# Patient Record
Sex: MALE | Race: WHITE
[De-identification: names, ages, dates, MRNs, and addresses within clinical notes are randomized per-mention and may not be internally consistent; named-entity substitution may affect disease eponyms.]

---

## 2019-03-18 LAB
ALBUMIN SERPL BCG-MCNC: 4.6 G/DL (ref 3.2–5.2)
ALBUMIN/GLOB SERPL: 2 {RATIO} (ref 1–3)
ALP SERPL-CCNC: 60 U/L (ref 34–104)
ALT SERPL W P-5'-P-CCNC: 23 U/L (ref 7–52)
ANION GAP SERPL CALC-SCNC: 7 MMOL/L (ref 2–11)
APTT PPP: 28.9 SECONDS (ref 26–36.3)
AST SERPL-CCNC: 21 U/L (ref 13–39)
BASOPHILS # BLD AUTO: 0 10^3/UL (ref 0–0.2)
BUN SERPL-MCNC: 12 MG/DL (ref 6–24)
BUN/CREAT SERPL: 12 (ref 8–20)
CALCIUM SERPL-MCNC: 9.2 MG/DL (ref 8.6–10.3)
CHLORIDE SERPL-SCNC: 102 MMOL/L (ref 101–111)
EOSINOPHIL # BLD AUTO: 0.1 10^3/UL (ref 0–0.6)
GLOBULIN SER CALC-MCNC: 2.3 G/DL (ref 2–4)
GLUCOSE SERPL-MCNC: 89 MG/DL (ref 70–100)
HCO3 SERPL-SCNC: 28 MMOL/L (ref 22–32)
HCT VFR BLD AUTO: 44 % (ref 36–46)
HGB BLD-MCNC: 15.3 G/DL (ref 14–18)
INR PPP/BLD: 0.91 (ref 0.77–1.02)
LYMPHOCYTES # BLD AUTO: 1.6 10^3/UL (ref 1–4.8)
MCH RBC QN AUTO: 33 PG (ref 27–31)
MCHC RBC AUTO-ENTMCNC: 35 G/DL (ref 31–36)
MCV RBC AUTO: 93 FL (ref 80–94)
MONOCYTES # BLD AUTO: 0.5 10^3/UL (ref 0–0.8)
NEUTROPHILS # BLD AUTO: 5.7 10^3/UL (ref 1.5–7.7)
NRBC # BLD AUTO: 0 10^3/UL
NRBC BLD QL AUTO: 0.1
PLATELET # BLD AUTO: 256 10^3/UL (ref 150–450)
POTASSIUM SERPL-SCNC: 4.2 MMOL/L (ref 3.5–5)
PROT SERPL-MCNC: 6.9 G/DL (ref 6.4–8.9)
RBC # BLD AUTO: 4.7 10^6 /UL (ref 4.18–5.48)
SODIUM SERPL-SCNC: 137 MMOL/L (ref 135–145)
WBC # BLD AUTO: 8 10^3/UL (ref 3.5–10.8)

## 2019-03-18 NOTE — HP
HISTORY AND PHYSICAL:

 

DATE OF ADMISSION/SURGERY:  03/26/19

 

DATE OF OFFICE VISIT:  03/18/19

 

SURGEON:  Rosa Ariza MD.* (DICTATED BY SUSIE GARBER)

 

PROCEDURE:  Left total knee arthroplasty.

 

CHIEF COMPLAINT:  Left knee pain.

 

HISTORY OF PRESENT ILLNESS:  Mr. Morataya is a 53-year-old gentleman with 
continued complaints of left knee pain.  He has failed conservative treatment 
and elected to proceed with a left total knee arthroplasty.

 

PAST MEDICAL HISTORY:  Sleep apnea.

 

PAST SURGICAL HISTORY:  Right total hip arthroplasty and appendectomy.

 

CURRENT MEDICATIONS:  Fluoxetine 40 mg a day.

 

ALLERGIES:  AMOXICILLIN and TIMOPTIC DROPS.

 

FAMILY HISTORY:  Cancer.

 

SOCIAL HISTORY:  He is a 53-year-old gentleman, lives with his wife.  He does 
not smoke or use drugs.  Uses occasional alcohol.

 

REVIEW OF SYSTEMS:  A complete 14-point review of systems was reviewed with the 
patient.  It was all negative or noncontributory.  He denies history of DVT, PE
, hepatitis, HIV, or anesthesia problems.

 

                               PHYSICAL EXAMINATION

 

GENERAL:  He is well developed, well nourished, in no acute distress.

 

VITAL SIGNS:  He stands 5 feet 1 inch tall, weighs 204 pounds.  His blood 
pressure is 134/80, his heart rate is 76.

 

HEENT:  Normocephalic, atraumatic.

 

NECK:  Supple.  No palpable lymph nodes.

 

PULMONARY:  The lungs are clear to auscultation bilaterally.

 

CARDIO:  Regular rate and rhythm.  Strong S1, S2.

 

ABDOMEN:  Soft, nontender, nondistended.

 

NEUROLOGICAL:  He is alert and oriented x3.

 

MUSCULOSKELETAL:  Left lower extremity:  The skin is intact.  There are no open 
wounds or abrasions.  There is a moderate joint effusion.  There is a valgus 
deformity of 12 degrees.  Range of motion is 10 to 125 degrees of flexion with 
significant patellofemoral crepitus.  He has a 2+ dorsalis pedis pulse.  He is 
able to dorsiflex and plantarflex and has intact sensation.

 

 ASSESSMENT AND PLAN:  Mr. Morataya is a 53-year-old gentleman with end-stage 
osteoarthritis of the left knee.  He has failed conservative treatment and 
elected to proceed with a left total knee arthroplasty.  The surgery is 
scheduled for 03/26/19 with Dr. Ariza.  Dr. Ariza discussed the risks and 
benefits of the surgery at today's visit and all of his questions were 
answered.  He will follow up with Dr. Ariza 2 weeks after the surgery.

 

 ____________________________________ SUSIE GARBER

 

[id number]/CPS #: [id number]

YAS

## 2019-03-26 ENCOUNTER — HOSPITAL ENCOUNTER (OUTPATIENT)
Dept: HOSPITAL 25 - OR | Age: 54
Setting detail: OBSERVATION
LOS: 1 days | Discharge: HOME | End: 2019-03-27
Attending: ORTHOPAEDIC SURGERY | Admitting: ORTHOPAEDIC SURGERY
Payer: COMMERCIAL

## 2019-03-26 DIAGNOSIS — M17.12: Primary | ICD-10-CM

## 2019-03-26 DIAGNOSIS — F32.9: ICD-10-CM

## 2019-03-26 DIAGNOSIS — Z85.828: ICD-10-CM

## 2019-03-26 DIAGNOSIS — Z96.641: ICD-10-CM

## 2019-03-26 DIAGNOSIS — Z79.899: ICD-10-CM

## 2019-03-26 DIAGNOSIS — Z88.1: ICD-10-CM

## 2019-03-26 DIAGNOSIS — Z91.09: ICD-10-CM

## 2019-03-26 DIAGNOSIS — G47.33: ICD-10-CM

## 2019-03-26 PROCEDURE — 85018 HEMOGLOBIN: CPT

## 2019-03-26 PROCEDURE — 96375 TX/PRO/DX INJ NEW DRUG ADDON: CPT

## 2019-03-26 PROCEDURE — 81003 URINALYSIS AUTO W/O SCOPE: CPT

## 2019-03-26 PROCEDURE — G0378 HOSPITAL OBSERVATION PER HR: HCPCS

## 2019-03-26 PROCEDURE — 80048 BASIC METABOLIC PNL TOTAL CA: CPT

## 2019-03-26 PROCEDURE — C1776 JOINT DEVICE (IMPLANTABLE): HCPCS

## 2019-03-26 PROCEDURE — 86900 BLOOD TYPING SEROLOGIC ABO: CPT

## 2019-03-26 PROCEDURE — 85610 PROTHROMBIN TIME: CPT

## 2019-03-26 PROCEDURE — 87086 URINE CULTURE/COLONY COUNT: CPT

## 2019-03-26 PROCEDURE — 85014 HEMATOCRIT: CPT

## 2019-03-26 PROCEDURE — 85730 THROMBOPLASTIN TIME PARTIAL: CPT

## 2019-03-26 PROCEDURE — 36415 COLL VENOUS BLD VENIPUNCTURE: CPT

## 2019-03-26 PROCEDURE — 80053 COMPREHEN METABOLIC PANEL: CPT

## 2019-03-26 PROCEDURE — 96374 THER/PROPH/DIAG INJ IV PUSH: CPT

## 2019-03-26 PROCEDURE — 85025 COMPLETE CBC W/AUTO DIFF WBC: CPT

## 2019-03-26 PROCEDURE — 86901 BLOOD TYPING SEROLOGIC RH(D): CPT

## 2019-03-26 PROCEDURE — 85049 AUTOMATED PLATELET COUNT: CPT

## 2019-03-26 PROCEDURE — 86850 RBC ANTIBODY SCREEN: CPT

## 2019-03-26 RX ADMIN — OXYCODONE HYDROCHLORIDE PRN MG: 5 CAPSULE ORAL at 22:59

## 2019-03-26 RX ADMIN — TRAMADOL HYDROCHLORIDE PRN MG: 50 TABLET, FILM COATED ORAL at 21:32

## 2019-03-26 RX ADMIN — OXYCODONE HYDROCHLORIDE PRN MG: 5 CAPSULE ORAL at 13:27

## 2019-03-26 RX ADMIN — DOCUSATE SODIUM SCH MG: 100 CAPSULE, LIQUID FILLED ORAL at 21:32

## 2019-03-26 RX ADMIN — ACETAMINOPHEN SCH MG: 325 TABLET ORAL at 13:27

## 2019-03-26 RX ADMIN — SODIUM CHLORIDE, SODIUM LACTATE, POTASSIUM CHLORIDE, AND CALCIUM CHLORIDE SCH MLS/HR: 600; 310; 30; 20 INJECTION, SOLUTION INTRAVENOUS at 23:03

## 2019-03-26 RX ADMIN — TRAMADOL HYDROCHLORIDE PRN MG: 50 TABLET, FILM COATED ORAL at 15:19

## 2019-03-26 RX ADMIN — MAGNESIUM HYDROXIDE SCH ML: 400 SUSPENSION ORAL at 21:32

## 2019-03-26 RX ADMIN — SODIUM CHLORIDE, SODIUM LACTATE, POTASSIUM CHLORIDE, AND CALCIUM CHLORIDE SCH MLS/HR: 600; 310; 30; 20 INJECTION, SOLUTION INTRAVENOUS at 12:25

## 2019-03-26 RX ADMIN — CYCLOBENZAPRINE HYDROCHLORIDE PRN MG: 10 TABLET, FILM COATED ORAL at 23:03

## 2019-03-26 RX ADMIN — OXYCODONE HYDROCHLORIDE PRN MG: 5 CAPSULE ORAL at 17:52

## 2019-03-26 RX ADMIN — ACETAMINOPHEN SCH MG: 325 TABLET ORAL at 21:40

## 2019-03-26 NOTE — PN
Progress Note





- Progress Note


Date of Service: 03/26/19


Note: 





Pt seen at bedside POD o sp total knee with Dr Ariza. Pain is rated 2/10. 

Denies CP, SOB, dizziness, nausea. Dressing CDI, DF/PF intact, DP2+, sensation 

intact to light touch distally. Eliquis 2.5 mg po BID starting 3/27 and SCDs 

for DVT prophylaxis.

## 2019-03-27 VITALS — SYSTOLIC BLOOD PRESSURE: 131 MMHG | DIASTOLIC BLOOD PRESSURE: 81 MMHG

## 2019-03-27 LAB
ANION GAP SERPL CALC-SCNC: 5 MMOL/L (ref 2–11)
BUN SERPL-MCNC: 11 MG/DL (ref 6–24)
BUN/CREAT SERPL: 13.4 (ref 8–20)
CALCIUM SERPL-MCNC: 8.3 MG/DL (ref 8.6–10.3)
CHLORIDE SERPL-SCNC: 97 MMOL/L (ref 101–111)
GLUCOSE SERPL-MCNC: 127 MG/DL (ref 70–100)
HCO3 SERPL-SCNC: 29 MMOL/L (ref 22–32)
HCT VFR BLD AUTO: 34 % (ref 36–46)
HGB BLD-MCNC: 12 G/DL (ref 14–18)
PLATELET # BLD AUTO: 240 10^3/UL (ref 150–450)
POTASSIUM SERPL-SCNC: 3.9 MMOL/L (ref 3.5–5)
SODIUM SERPL-SCNC: 131 MMOL/L (ref 135–145)

## 2019-03-27 RX ADMIN — OXYCODONE HYDROCHLORIDE PRN MG: 5 CAPSULE ORAL at 10:12

## 2019-03-27 RX ADMIN — ACETAMINOPHEN SCH MG: 325 TABLET ORAL at 13:49

## 2019-03-27 RX ADMIN — CYCLOBENZAPRINE HYDROCHLORIDE PRN MG: 10 TABLET, FILM COATED ORAL at 13:48

## 2019-03-27 RX ADMIN — DOCUSATE SODIUM SCH MG: 100 CAPSULE, LIQUID FILLED ORAL at 07:56

## 2019-03-27 RX ADMIN — TRAMADOL HYDROCHLORIDE PRN MG: 50 TABLET, FILM COATED ORAL at 03:31

## 2019-03-27 RX ADMIN — ACETAMINOPHEN SCH MG: 325 TABLET ORAL at 06:28

## 2019-03-27 RX ADMIN — OXYCODONE HYDROCHLORIDE PRN MG: 5 CAPSULE ORAL at 04:41

## 2019-03-27 RX ADMIN — MAGNESIUM HYDROXIDE SCH ML: 400 SUSPENSION ORAL at 07:56

## 2019-03-27 RX ADMIN — TRAMADOL HYDROCHLORIDE PRN MG: 50 TABLET, FILM COATED ORAL at 12:16

## 2019-03-27 RX ADMIN — OXYCODONE HYDROCHLORIDE PRN MG: 5 CAPSULE ORAL at 15:10

## 2019-03-27 NOTE — OP
OPERATIVE REPORT:

 

DATE OF OPERATION:  03/26/19

 

DATE OF BIRTH:  [birth year]

 

ATTENDING SURGEON:  Rosa Ariza MD.

 

ASSISTANT:  SUSIE Alfonso.

 

Ms. Ibrahim did help throughout the procedure with preparation of the knee, wound retraction, manipu
lation of the knee, and wound closure.

 

ANESTHESIOLOGIST:  Dr. Thomas.

 

ANESTHESIA:  Spinal.

 

PRE-OP DIAGNOSIS:  Severe end-stage degenerative osteoarthritis of the left knee joint.

 

POST-OP DIAGNOSIS:  Severe end-stage degenerative osteoarthritis of the left knee joint.

 

OPERATIVE PROCEDURE:  Left total knee arthroplasty.

 

TOURNIQUET TIME:  51 minutes.

 

COMPLICATIONS:  None.

 

ESTIMATED BLOOD LOSS:  200 cc.

 

SPECIMEN:  Bone and cartilage from the left knee joint sent to pathology.

 

HARDWARE USED:  This is cemented [employer] total knee arthroplasty hardware with 2 packages of
 Simplex bone cement.  For the femur, a size left 7 Oxinium posterior stabilized femoral component.  
For the tibia, a size 6 left tibial baseplate Claudia II.  For the insert, a 9 mm posterior stabilize
d articular insert size 5/6 and for the patella, a 35 mm 3-peg hole all-poly patella.

 

BRIEF HISTORY/INDICATION:  Mr. Morataya is a 53-year-old gentleman with years of increasingly severe 
left knee pain.  Radiographs showed advanced arthritis with bone-on-bone contact.  He failed conserva
tive treatment and elected to undergo left total knee arthroplasty.  Informed consent was obtained fr
om the patient.  He understood the risks of surgery included, but were not limited to bleeding, infec
tion, damage to nearby structures, continued pain, need for further surgery, intraoperative fracture,
 nerve palsy, hardware failure or loosening, knee stiffness, loss of motion, stroke, heart attack, bl
ood clot, death, and anesthesia complications.  He wished to proceed.

 

INTRAOPERATIVE FINDINGS:  Intraoperatively, the patient was noted to have severe end-stage arthritis 
with complete loss of cartilage in all 3 compartments.  He had extensive osteophyte formation.

 

DESCRIPTION OF PROCEDURE:  Mr. Morataya was identified in the preanesthesia unit. His left lower extr
emity was marked as the correct operative site.  Informed consent was signed and placed in the chart.
  The patient was taken to the operating room and placed under spinal anesthesia.  A Duenas catheter w
as placed.  A tourniquet was placed on the left thigh.  The left lower extremity was prepped and drap
ed in the usual sterile fashion.  Preop time-out was made to correctly identify the patient, side, an
d site.  Appropriate perioperative antibiotics were given within 1 hour of the incision.

 

Tourniquet was inflated and total tourniquet time for this procedure was 51 minutes.  A midline incis
ion was made with a 10-blade and carried down to the extensor mechanism.  A new 10-blade was used to 
make a standard medial parapatellar arthrotomy. The patella was subluxed laterally.  Electrocautery w
as used to subperiosteally  elevate the soft tissue off the superomedial tibia to the midsagittal mayra
ne.  The knee was flexed up.  The anterior horn of the lateral meniscus and ACL were sharply released
.  A drill was used to enter the distal femur.  Intramedullary distal femoral cutting guide was pinne
d on the distal femur. An oscillating saw was used to make a distal femoral cut.  Next, the external 
rotation guide was pinned on the distal femur.  The distal femur was sized to a size 7.  A size 7 mul
ti-cutting jig was pinned on the distal femur.  The oscillating saw was used to make the appropriate 
4 chamfer cuts.

 

The PCL was completely released.  An extramedullary tibial cutting guide was pinned on the proximal t
ibia.  Oscillating saw was used to make the proximal tibial cut perpendicular to the mechanical axis 
of the tibia.  The bone was carefully removed. The knee was brought out to full extension.  A spacer 
block had good fit with the knee in full extension.  Medial and lateral ligaments were well balanced.
  Flexion and extension gaps were well balanced.  The knee was flexed up.  The lamina  was pl
aced both medially and laterally.  Any remaining meniscus was carefully removed using electrocautery.
  A curved osteotome was used to remove any posterior osteophytes.  Tibial tray and drop rods were pl
aced and confirmed a satisfactory tibial cut.  A size 7 left femoral trial was impacted onto the dist
al femur and had excellent fit and stability.  The box for the posterior stabilized implant was prepa
red using a reamer and box cut osteotome.  A size 6 tibial tray trial with a 9 mm insert trial was pl
aced and the knee was taken through a range of motion.  The knee had full extension to 130 degrees of
 flexion with satisfactory patellofemoral tracking.  The patella was everted; 9 mm of patellar bone a
nd cartilage was carefully removed using an oscillating saw.  The patella was sized to a size 35.  Th
ree peg holes were drilled through the size 35 guide.  A 35 size trial patella was placed and the kne
e was taken through a range of motion.  There was satisfactory patellofemoral tracking.

 

All trials were carefully removed.  The tibia was subluxed anteriorly and sized to a size 6.  The pro
ximal tibia was prepared using a size 6 keel punch.  All bony cut surfaces were copiously irrigated w
ith sterile saline and dried.  Final implants were cemented into place starting with the tibia, follo
wed by the femur and last the patella.  A 9 mm insert trial was placed and the knee was brought out t
o full extension.  Tourniquet was turned down at 51 minutes.  The knee was copiously irrigated with s
terile saline.  Electrocautery was used to obtain meticulous hemostasis.  Once the cement had fully c
ured, the insert trial was removed.  Any excess cement was removed from around the capsule and hardwa
re.  Final insert chosen was a 9 mm posterior stabilized articular insert, size 5/6.  This was locked
 into position on the tibial tray.  Stability of the insert was checked and rechecked and noted to be
 stable.  The extensor mechanism was closed using interrupted #1 Vicryls.  The rest of the incision w
as closed in a layered fashion using 0 and 2-0 Vicryls.  Skin was closed using running 3-0 nylon sutu
re.  Sterile Xeroform, 4x4s, and Webril were used to cover the incision.  Ace wrap and cold pack were
 placed over this.  The patient's anesthesia was reversed without difficulty. He was taken to the PAC
U in stable condition.  Intended weightbearing will be weightbearing as tolerated.  Intended DVT prop
hylaxis will be Eliquis.

 

 [id number]/[facility] #: [id number]

## 2019-03-27 NOTE — PN
Progress Note





- Progress Note


Date of Service: 03/27/19


SOAP: 


Subjective:


[]Pt seen at bedside. He feels very well with well controlled knee pain and 

desires to go home today. Denies CP, SOB, dizziness, nausea. 





Objective:


[]General: Well appearing, NAD 


LLE: Left knee dressing changed, incision CDI. Thigh is soft, DF/PT intact, 

sensation intact to light touch distally, DP2+.


Calves supple and nontender without erythema, edema or palpable cords





Assessment:


[] POD 1 sp LTK





Plan:


[]WBAT


PT/OT 


eliquis 2.5 mg po BID 


DC home today after afternoon PT 


Will need repeat sodium as an outpt 





 Vital Signs











Temp  98.3 F   03/27/19 07:57


 


Pulse  71   03/27/19 07:57


 


Resp  20   03/27/19 10:12


 


BP  124/69   03/27/19 07:57


 


Pulse Ox  95   03/27/19 08:00








 Intake & Output











 03/26/19 03/27/19 03/27/19





 18:59 06:59 18:59


 


Intake Total 3620 1880 


 


Output Total 550 1475 500


 


Balance 3070 405 -500


 


Intake:   


 


  IV Fluids 2700 980 


 


    LR 2700 980 


 


  IVPB  50 


 


    ABX - CLINDAMYCIN  50 


 


  Oral 920 850 


 


Output:   


 


  Urine 550 975 500


 


  Duenas  500 


 


Other:   


 


  # Voids   1








 Laboratory Last Values











WBC  8.0 10^3/uL (3.5-10.8)   03/18/19  12:00    


 


RBC  4.70 10^6 /uL (4.18-5.48)   03/18/19  12:00    


 


Hgb  12.0 g/dL (14.0-18.0)  L  03/27/19  06:46    


 


Hct  34 % (36-46)  L  03/27/19  06:46    


 


MCV  93 fL (80-94)   03/18/19  12:00    


 


MCH  33 pg (27-31)  H  03/18/19  12:00    


 


MCHC  35 g/dL (31-36)   03/18/19  12:00    


 


RDW  13 % (10.5-15)   03/18/19  12:00    


 


Plt Count  240 10^3/uL (150-450)   03/27/19  06:46    


 


MPV  6.9 fL (7.4-10.4)  L  03/27/19  06:46    


 


Neut % (Auto)  71.3 %  03/18/19  12:00    


 


Lymph % (Auto)  20.4 %  03/18/19  12:00    


 


Mono % (Auto)  5.9 %  03/18/19  12:00    


 


Eos % (Auto)  1.9 %  03/18/19  12:00    


 


Baso % (Auto)  0.5 %  03/18/19  12:00    


 


Absolute Neuts (auto)  5.7 10^3/ul (1.5-7.7)   03/18/19  12:00    


 


Absolute Lymphs (auto)  1.6 10^3/ul (1.0-4.8)   03/18/19  12:00    


 


Absolute Monos (auto)  0.5 10^3/ul (0-0.8)   03/18/19  12:00    


 


Absolute Eos (auto)  0.1 10^3/ul (0-0.6)   03/18/19  12:00    


 


Absolute Basos (auto)  0 10^3/ul (0-0.2)   03/18/19  12:00    


 


Absolute Nucleated RBC  0 10^3/ul  03/18/19  12:00    


 


Nucleated RBC %  0.1   03/18/19  12:00    


 


INR (Anticoag Therapy)  0.91  (0.77-1.02)   03/18/19  12:00    


 


APTT  28.9 seconds (26.0-36.3)   03/18/19  12:00    


 


Sodium  131 mmol/L (135-145)  L  03/27/19  06:46    


 


Potassium  3.9 mmol/L (3.5-5.0)   03/27/19  06:46    


 


Chloride  97 mmol/L (101-111)  L  03/27/19  06:46    


 


Carbon Dioxide  29 mmol/L (22-32)   03/27/19  06:46    


 


Anion Gap  5 mmol/L (2-11)   03/27/19  06:46    


 


BUN  11 mg/dL (6-24)   03/27/19  06:46    


 


Creatinine  0.82 mg/dL (0.67-1.17)   03/27/19  06:46    


 


Est GFR ( Amer)  118.0  (>60)   03/27/19  06:46    


 


Est GFR (Non-Af Amer)  97.5  (>60)   03/27/19  06:46    


 


BUN/Creatinine Ratio  13.4  (8-20)   03/27/19  06:46    


 


Glucose  127 mg/dL ()  H  03/27/19  06:46    


 


Calcium  8.3 mg/dL (8.6-10.3)  L  03/27/19  06:46    


 


Total Bilirubin  0.70 mg/dL (0.2-1.0)   03/18/19  12:00    


 


AST  21 U/L (13-39)   03/18/19  12:00    


 


ALT  23 U/L (7-52)   03/18/19  12:00    


 


Alkaline Phosphatase  60 U/L ()   03/18/19  12:00    


 


Total Protein  6.9 g/dL (6.4-8.9)   03/18/19  12:00    


 


Albumin  4.6 g/dL (3.2-5.2)   03/18/19  12:00    


 


Globulin  2.3 g/dL (2-4)   03/18/19  12:00    


 


Albumin/Globulin Ratio  2.0  (1-3)   03/18/19  12:00    


 


Urine Color  Madyson   03/18/19  12:00    


 


Urine Appearance  Clear   03/18/19  12:00    


 


Urine pH  6.0  (5-9)   03/18/19  12:00    


 


Ur Specific Gravity  1.024  (1.010-1.030)   03/18/19  12:00    


 


Urine Protein  Negative  (Negative)   03/18/19  12:00    


 


Urine Ketones  Negative  (Negative)   03/18/19  12:00    


 


Urine Blood  Negative  (Negative)   03/18/19  12:00    


 


Urine Nitrate  Negative  (Negative)   03/18/19  12:00    


 


Urine Bilirubin  Negative  (Negative)   03/18/19  12:00    


 


Urine Urobilinogen  Negative  (Negative)   03/18/19  12:00    


 


Ur Leukocyte Esterase  Negative  (Negative)   03/18/19  12:00    


 


Urine Glucose  Negative  (Negative)   03/18/19  12:00    


 


Blood Type  B Positive   03/18/19  12:00    


 


Antibody Screen  Negative   03/18/19  12:00

## 2019-03-28 NOTE — DS
CC:  Dr. Rosa Ariza*

 

DISCHARGE SUMMARY:

 

DATE OF ADMISSION:  03/26/19

 

DATE OF DISCHARGE:  03/27/19

 

PROVIDER:  Dr. Rosa Ariza* (dictated by SUSIE Mays).

 

PREOP DIAGNOSIS:  Severe end-stage degenerative osteoarthritis of left knee 
joint.

 

OPERATIVE PROCEDURE:  Left total knee arthroplasty.

 

HISTORY:  Mr. Morataya is a 53-year-old male with years of increasingly severe 
left knee pain.  He failed conservative treatment and elected to undergo a left 
total knee arthroplasty.

 

HOSPITAL COURSE:  The patient was admitted to [facility] on 03/26/
19. He underwent left total knee arthroplasty without complication.  Postop day 
#1, he is well appearing in no acute distress.  Dressing was changed.  Incision 
clean, dry, and intact. Thigh is soft.  Dorsiflexion and plantarflexion intact.
  Sensation intact to light touch distally.  DP 2+.  Calf supple and nontender 
without erythema, edema or palpable cords.

 

Vital Signs:  Temperature 98.3, pulse 71, respiratory rate 20, blood pressure 
124/69, and pulse ox 95%.

 

LABORATORY DATA:  Hemoglobin 12.3, hematocrit 34.  Sodium 131, potassium 3.9.

 

The patient is deemed to be medically and orthopedically stable for discharge 
home.

 

DISCHARGE MEDICATIONS:

1.  Fluoxetine 60 mg p.o. q.a.m.

2.  Multivitamin 1 tab daily.

3.  Glucosamine chondroitin 1 tab p.o. q.a.m.

4.  Eliquis 2.5 mg p.o. b.i.d. for 30 days.

5.  Docusate 100 mg p.o. b.i.d.

6.  Percocet 5/325 one to two tabs every 4 to 6 hours as needed for pain, max 
daily dose of 10.

7.  Flexeril 10 mg tabs 1 tab p.o. t.i.d. p.r.n.

 

DISCHARGE INSTRUCTIONS:  The patient will be weightbearing as tolerated.  He 
needs a sodium level drawn as an outpatient within 2 days for low sodium in-
house.  DVT prophylaxis with Eliquis 2.5 mg daily for 1 month.  Pain control 
Percocet 5/325 one to two tabs by mouth every 4 to 6 hours as needed for pain, 
max daily dose of 10. Follow up with Dr. Ariza in 10 to 14 days.

 

DISCHARGE DISPOSITION:  He is discharged home in stable on 03/27/19.

 

____________________________________ 

SUSIE DEY

 

[id number]/CPS #: [id number]

[facility]